# Patient Record
Sex: MALE | URBAN - METROPOLITAN AREA
[De-identification: names, ages, dates, MRNs, and addresses within clinical notes are randomized per-mention and may not be internally consistent; named-entity substitution may affect disease eponyms.]

---

## 2020-12-03 ENCOUNTER — APPOINTMENT (OUTPATIENT)
Dept: URBAN - METROPOLITAN AREA CLINIC 289 | Age: 45
Setting detail: DERMATOLOGY
End: 2020-12-03

## 2020-12-03 DIAGNOSIS — L40.0 PSORIASIS VULGARIS: ICD-10-CM

## 2020-12-03 DIAGNOSIS — L29.8 OTHER PRURITUS: ICD-10-CM

## 2020-12-03 PROCEDURE — OTHER INTRALESIONAL KENALOG: OTHER

## 2020-12-03 PROCEDURE — OTHER PRESCRIPTION: OTHER

## 2020-12-03 PROCEDURE — OTHER TREATMENT REGIMEN: OTHER

## 2020-12-03 PROCEDURE — 99202 OFFICE O/P NEW SF 15 MIN: CPT | Mod: 25

## 2020-12-03 PROCEDURE — 11900 INJECT SKIN LESIONS </W 7: CPT

## 2020-12-03 RX ORDER — BETAMETHASONE VALERATE 1 MG/G
OINTMENT TOPICAL
Qty: 1 | Refills: 2 | Status: ERX | COMMUNITY
Start: 2020-12-03

## 2020-12-03 ASSESSMENT — LOCATION SIMPLE DESCRIPTION DERM
LOCATION SIMPLE: LEFT BUTTOCK
LOCATION SIMPLE: RIGHT ELBOW
LOCATION SIMPLE: LEFT FOREARM
LOCATION SIMPLE: GLUTEAL CLEFT
LOCATION SIMPLE: RIGHT KNEE
LOCATION SIMPLE: LEFT KNEE

## 2020-12-03 ASSESSMENT — BSA PSORIASIS: % BODY COVERED IN PSORIASIS: 4

## 2020-12-03 ASSESSMENT — LOCATION ZONE DERM
LOCATION ZONE: LEG
LOCATION ZONE: ARM
LOCATION ZONE: TRUNK

## 2020-12-03 ASSESSMENT — LOCATION DETAILED DESCRIPTION DERM
LOCATION DETAILED: RIGHT KNEE
LOCATION DETAILED: LEFT KNEE
LOCATION DETAILED: RIGHT ELBOW
LOCATION DETAILED: GLUTEAL CLEFT
LOCATION DETAILED: LEFT PROXIMAL DORSAL FOREARM
LOCATION DETAILED: LEFT MEDIAL BUTTOCK

## 2020-12-03 NOTE — PROCEDURE: INTRALESIONAL KENALOG
Detail Level: Detailed
Kenalog Preparation: Kenalog
Concentration Of Solution Injected (Mg/Ml): 10.0
X Size Of Lesion In Cm (Optional): 0
Consent: The risks of atrophy were reviewed with the patient.
Total Volume Injected (Ccs- Only Use Numbers And Decimals): 3.0
Include Z78.9 (Other Specified Conditions Influencing Health Status) As An Associated Diagnosis?: No
Medical Necessity Clause: This procedure was medically necessary because the lesions that were treated were:

## 2021-01-07 ENCOUNTER — APPOINTMENT (OUTPATIENT)
Dept: URBAN - METROPOLITAN AREA CLINIC 289 | Age: 46
Setting detail: DERMATOLOGY
End: 2021-01-07

## 2021-01-07 DIAGNOSIS — L40.0 PSORIASIS VULGARIS: ICD-10-CM

## 2021-01-07 PROCEDURE — 11900 INJECT SKIN LESIONS </W 7: CPT

## 2021-01-07 PROCEDURE — OTHER INTRALESIONAL KENALOG: OTHER

## 2021-01-07 ASSESSMENT — LOCATION ZONE DERM: LOCATION ZONE: LEG

## 2021-01-07 ASSESSMENT — LOCATION SIMPLE DESCRIPTION DERM: LOCATION SIMPLE: LEFT KNEE

## 2021-01-07 ASSESSMENT — LOCATION DETAILED DESCRIPTION DERM: LOCATION DETAILED: LEFT KNEE

## 2021-01-07 ASSESSMENT — BSA PSORIASIS: % BODY COVERED IN PSORIASIS: 1

## 2021-01-07 NOTE — PROCEDURE: INTRALESIONAL KENALOG
Total Volume Injected (Ccs- Only Use Numbers And Decimals): 1.0
Include Z78.9 (Other Specified Conditions Influencing Health Status) As An Associated Diagnosis?: No
Concentration Of Solution Injected (Mg/Ml): 10.0
Consent: The risks of atrophy were reviewed with the patient.
Kenalog Preparation: Kenalog
Medical Necessity Clause: This procedure was medically necessary because the lesions that were treated were:
X Size Of Lesion In Cm (Optional): 0
Detail Level: Detailed

## 2021-05-27 ENCOUNTER — APPOINTMENT (OUTPATIENT)
Dept: URBAN - METROPOLITAN AREA CLINIC 289 | Age: 46
Setting detail: DERMATOLOGY
End: 2021-05-27

## 2021-05-27 DIAGNOSIS — L85.3 XEROSIS CUTIS: ICD-10-CM

## 2021-05-27 DIAGNOSIS — L40.0 PSORIASIS VULGARIS: ICD-10-CM

## 2021-05-27 PROCEDURE — 11901 INJECT SKIN LESIONS >7: CPT

## 2021-05-27 PROCEDURE — OTHER INTRALESIONAL KENALOG: OTHER

## 2021-05-27 PROCEDURE — OTHER PRESCRIPTION: OTHER

## 2021-05-27 PROCEDURE — 99212 OFFICE O/P EST SF 10 MIN: CPT | Mod: 25

## 2021-05-27 PROCEDURE — OTHER COUNSELING: OTHER

## 2021-05-27 RX ORDER — TRIAMCINOLONE ACETONIDE 1 MG/G
OINTMENT TOPICAL
Qty: 1 | Refills: 4 | Status: ERX | COMMUNITY
Start: 2021-05-27

## 2021-05-27 ASSESSMENT — LOCATION DETAILED DESCRIPTION DERM
LOCATION DETAILED: LEFT PROXIMAL DORSAL FOREARM
LOCATION DETAILED: LEFT ELBOW
LOCATION DETAILED: RIGHT ELBOW
LOCATION DETAILED: LEFT KNEE
LOCATION DETAILED: RIGHT PROXIMAL DORSAL FOREARM
LOCATION DETAILED: LEFT ANTERIOR DISTAL THIGH
LOCATION DETAILED: LEFT PROXIMAL ULNAR DORSAL FOREARM

## 2021-05-27 ASSESSMENT — LOCATION ZONE DERM
LOCATION ZONE: LEG
LOCATION ZONE: ARM

## 2021-05-27 ASSESSMENT — LOCATION SIMPLE DESCRIPTION DERM
LOCATION SIMPLE: LEFT THIGH
LOCATION SIMPLE: RIGHT ELBOW
LOCATION SIMPLE: LEFT ELBOW
LOCATION SIMPLE: LEFT FOREARM
LOCATION SIMPLE: LEFT KNEE
LOCATION SIMPLE: RIGHT FOREARM

## 2021-05-27 NOTE — PROCEDURE: INTRALESIONAL KENALOG
Concentration Of Solution Injected (Mg/Ml): 5.0
Validate Note Data When Using Inventory: Yes
Total Volume Injected (Ccs- Only Use Numbers And Decimals): 2
Kenalog Preparation: Kenalog
Medical Necessity Clause: This procedure was medically necessary because the lesions that were treated were:
Include Z78.9 (Other Specified Conditions Influencing Health Status) As An Associated Diagnosis?: No
Consent: The risks of atrophy were reviewed with the patient.
X Size Of Lesion In Cm (Optional): 0
Detail Level: Detailed

## 2021-06-24 ENCOUNTER — APPOINTMENT (OUTPATIENT)
Dept: URBAN - METROPOLITAN AREA CLINIC 289 | Age: 46
Setting detail: DERMATOLOGY
End: 2021-06-24

## 2021-06-24 DIAGNOSIS — L40.0 PSORIASIS VULGARIS: ICD-10-CM

## 2021-06-24 DIAGNOSIS — L85.3 XEROSIS CUTIS: ICD-10-CM

## 2021-06-24 PROCEDURE — OTHER PRESCRIPTION MEDICATION MANAGEMENT: OTHER

## 2021-06-24 PROCEDURE — OTHER COUNSELING: OTHER

## 2021-06-24 PROCEDURE — OTHER INTRALESIONAL KENALOG: OTHER

## 2021-06-24 PROCEDURE — 99212 OFFICE O/P EST SF 10 MIN: CPT | Mod: 25

## 2021-06-24 PROCEDURE — 11901 INJECT SKIN LESIONS >7: CPT

## 2021-06-24 ASSESSMENT — LOCATION DETAILED DESCRIPTION DERM
LOCATION DETAILED: LEFT KNEE
LOCATION DETAILED: RIGHT ELBOW
LOCATION DETAILED: LEFT ELBOW
LOCATION DETAILED: RIGHT PROXIMAL DORSAL FOREARM
LOCATION DETAILED: LEFT PROXIMAL DORSAL FOREARM
LOCATION DETAILED: LEFT ANTERIOR DISTAL THIGH
LOCATION DETAILED: LEFT PROXIMAL ULNAR DORSAL FOREARM

## 2021-06-24 ASSESSMENT — LOCATION ZONE DERM
LOCATION ZONE: ARM
LOCATION ZONE: LEG

## 2021-06-24 ASSESSMENT — LOCATION SIMPLE DESCRIPTION DERM
LOCATION SIMPLE: LEFT FOREARM
LOCATION SIMPLE: RIGHT FOREARM
LOCATION SIMPLE: LEFT ELBOW
LOCATION SIMPLE: LEFT KNEE
LOCATION SIMPLE: LEFT THIGH
LOCATION SIMPLE: RIGHT ELBOW

## 2021-06-24 NOTE — PROCEDURE: PRESCRIPTION MEDICATION MANAGEMENT
Render In Strict Bullet Format?: No
Continue Regimen: Betamethasone bid prn no longer 2 weeks/month
Detail Level: Zone

## 2021-06-24 NOTE — PROCEDURE: INTRALESIONAL KENALOG
Validate Note Data When Using Inventory: Yes
Kenalog Preparation: Kenalog
X Size Of Lesion In Cm (Optional): 0
Consent: The risks of atrophy were reviewed with the patient.
Concentration Of Solution Injected (Mg/Ml): 5.0
Medical Necessity Clause: This procedure was medically necessary because the lesions that were treated were:
Detail Level: Detailed
Total Volume Injected (Ccs- Only Use Numbers And Decimals): 2
Include Z78.9 (Other Specified Conditions Influencing Health Status) As An Associated Diagnosis?: No

## 2022-07-09 ENCOUNTER — TELEPHONE ENCOUNTER (OUTPATIENT)
Dept: URBAN - METROPOLITAN AREA CLINIC 121 | Facility: CLINIC | Age: 47
End: 2022-07-09

## 2022-07-09 RX ORDER — OMEPRAZOLE MAGNESIUM 20.6 MG/1
CAPSULE, DELAYED RELEASE ORAL TWICE A DAY
Refills: 0 | OUTPATIENT
Start: 2017-10-25 | End: 2018-04-10

## 2022-07-09 RX ORDER — OMEPRAZOLE MAGNESIUM 20.6 MG/1
CAPSULE, DELAYED RELEASE ORAL TWICE A DAY
Refills: 0 | OUTPATIENT
Start: 2017-09-19 | End: 2017-10-25

## 2022-07-09 RX ORDER — ATOMOXETINE HYDROCHLORIDE 80 MG/1
CAPSULE ORAL ONCE A DAY
Refills: 0 | OUTPATIENT
Start: 2017-07-25 | End: 2017-09-19

## 2022-07-09 RX ORDER — ZOLPIDEM TARTRATE 10 MG
TABLET ORAL TAKE AS DIRECTED
Refills: 0 | OUTPATIENT
Start: 2017-10-25 | End: 2018-04-10

## 2022-07-09 RX ORDER — OLANZAPINE 15 MG/1
TABLET ORAL
Refills: 0 | OUTPATIENT
Start: 2017-10-25 | End: 2018-04-10

## 2022-07-09 RX ORDER — BUSPIRONE HYDROCHLORIDE 10 MG/1
TABLET ORAL TWICE A DAY
Refills: 0 | OUTPATIENT
Start: 2018-04-10 | End: 2018-08-21

## 2022-07-09 RX ORDER — MIRTAZAPINE 30 MG/1
TABLET, FILM COATED ORAL
Refills: 0 | OUTPATIENT
Start: 2017-09-19 | End: 2017-10-25

## 2022-07-09 RX ORDER — TRAZODONE HYDROCHLORIDE 100 MG/1
TABLET ORAL
Refills: 0 | OUTPATIENT
Start: 2017-09-19 | End: 2017-10-25

## 2022-07-09 RX ORDER — BUSPIRONE HYDROCHLORIDE 10 MG/1
TABLET ORAL TWICE A DAY
Refills: 0 | OUTPATIENT
Start: 2017-09-19 | End: 2017-10-25

## 2022-07-09 RX ORDER — MECLIZINE HYDROCHLORIDE 25 MG/1
TABLET ORAL TWICE A DAY
Refills: 0 | OUTPATIENT
Start: 2018-04-10 | End: 2018-08-21

## 2022-07-09 RX ORDER — ATOMOXETINE HYDROCHLORIDE 80 MG/1
CAPSULE ORAL ONCE A DAY
Refills: 0 | OUTPATIENT
Start: 2017-10-25 | End: 2018-04-10

## 2022-07-09 RX ORDER — MIRTAZAPINE 30 MG/1
TABLET, FILM COATED ORAL
Refills: 0 | OUTPATIENT
Start: 2018-04-10 | End: 2018-08-21

## 2022-07-09 RX ORDER — TRAZODONE HYDROCHLORIDE 100 MG/1
TABLET ORAL
Refills: 0 | OUTPATIENT
Start: 2017-10-25 | End: 2018-04-10

## 2022-07-09 RX ORDER — MIRTAZAPINE 30 MG/1
TABLET, FILM COATED ORAL
Refills: 0 | OUTPATIENT
Start: 2017-10-25 | End: 2018-04-10

## 2022-07-09 RX ORDER — GLECAPREVIR AND PIBRENTASVIR 40; 100 MG/1; MG/1
TABLET, FILM COATED ORAL
Refills: 1 | OUTPATIENT
Start: 2017-12-28 | End: 2017-12-28

## 2022-07-09 RX ORDER — PHENAZOPYRIDINE HYDROCHLORIDE 100 MG/1
TABLET ORAL AS NEEDED
Refills: 0 | OUTPATIENT
Start: 2018-04-10 | End: 2018-08-21

## 2022-07-09 RX ORDER — ATOMOXETINE HYDROCHLORIDE 80 MG/1
CAPSULE ORAL ONCE A DAY
Refills: 0 | OUTPATIENT
Start: 2018-04-10 | End: 2018-08-21

## 2022-07-09 RX ORDER — OMEPRAZOLE MAGNESIUM 20.6 MG/1
CAPSULE, DELAYED RELEASE ORAL TWICE A DAY
Refills: 0 | OUTPATIENT
Start: 2018-04-10 | End: 2018-08-21

## 2022-07-09 RX ORDER — MIRTAZAPINE 30 MG/1
TABLET, FILM COATED ORAL
Refills: 0 | OUTPATIENT
Start: 2017-07-25 | End: 2017-09-19

## 2022-07-09 RX ORDER — BUSPIRONE HYDROCHLORIDE 10 MG/1
TABLET ORAL TWICE A DAY
Refills: 0 | OUTPATIENT
Start: 2017-10-25 | End: 2018-04-10

## 2022-07-09 RX ORDER — PHENAZOPYRIDINE HYDROCHLORIDE 100 MG/1
TABLET ORAL AS NEEDED
Refills: 0 | OUTPATIENT
Start: 2017-09-19 | End: 2017-10-25

## 2022-07-09 RX ORDER — TRAZODONE HYDROCHLORIDE 100 MG/1
TABLET ORAL
Refills: 0 | OUTPATIENT
Start: 2017-07-25 | End: 2017-09-19

## 2022-07-09 RX ORDER — MECLIZINE HYDROCHLORIDE 25 MG/1
TABLET ORAL TWICE A DAY
Refills: 0 | OUTPATIENT
Start: 2017-07-25 | End: 2017-09-19

## 2022-07-09 RX ORDER — BUSPIRONE HYDROCHLORIDE 10 MG/1
TABLET ORAL TWICE A DAY
Refills: 0 | OUTPATIENT
Start: 2018-08-21 | End: 2018-08-21

## 2022-07-09 RX ORDER — OLANZAPINE 15 MG/1
TABLET ORAL
Refills: 0 | OUTPATIENT
Start: 2018-04-10 | End: 2018-08-21

## 2022-07-09 RX ORDER — FLUOXETINE HYDROCHLORIDE 40 MG/1
CAPSULE ORAL ONCE A DAY
Refills: 0 | OUTPATIENT
Start: 2017-09-19 | End: 2017-10-25

## 2022-07-09 RX ORDER — OMEPRAZOLE MAGNESIUM 20.6 MG/1
CAPSULE, DELAYED RELEASE ORAL TWICE A DAY
Refills: 0 | OUTPATIENT
Start: 2017-07-25 | End: 2017-09-19

## 2022-07-09 RX ORDER — ZOLPIDEM TARTRATE 10 MG
TABLET ORAL TAKE AS DIRECTED
Refills: 0 | OUTPATIENT
Start: 2018-04-10 | End: 2018-08-21

## 2022-07-09 RX ORDER — FLUOXETINE HYDROCHLORIDE 40 MG/1
CAPSULE ORAL ONCE A DAY
Refills: 0 | OUTPATIENT
Start: 2017-07-25 | End: 2017-09-19

## 2022-07-09 RX ORDER — MECLIZINE HYDROCHLORIDE 25 MG/1
TABLET ORAL TWICE A DAY
Refills: 0 | OUTPATIENT
Start: 2017-09-19 | End: 2017-10-25

## 2022-07-09 RX ORDER — ATOMOXETINE HYDROCHLORIDE 80 MG/1
CAPSULE ORAL ONCE A DAY
Refills: 0 | OUTPATIENT
Start: 2017-09-19 | End: 2017-10-25

## 2022-07-09 RX ORDER — ZOLPIDEM TARTRATE 10 MG
TABLET ORAL TAKE AS DIRECTED
Refills: 0 | OUTPATIENT
Start: 2017-09-19 | End: 2017-10-25

## 2022-07-09 RX ORDER — BUSPIRONE HYDROCHLORIDE 10 MG/1
TABLET ORAL TWICE A DAY
Refills: 0 | OUTPATIENT
Start: 2017-07-25 | End: 2017-09-19

## 2022-07-09 RX ORDER — MECLIZINE HYDROCHLORIDE 25 MG/1
TABLET ORAL TWICE A DAY
Refills: 0 | OUTPATIENT
Start: 2017-10-25 | End: 2018-04-10

## 2022-07-09 RX ORDER — LORAZEPAM 0.5 MG/1
TABLET ORAL ONCE A DAY
Refills: 0 | OUTPATIENT
Start: 2018-04-10 | End: 2018-08-21

## 2022-07-09 RX ORDER — PHENAZOPYRIDINE HYDROCHLORIDE 100 MG/1
TABLET ORAL AS NEEDED
Refills: 0 | OUTPATIENT
Start: 2017-10-25 | End: 2018-04-10

## 2022-07-09 RX ORDER — PHENAZOPYRIDINE HYDROCHLORIDE 100 MG/1
TABLET ORAL AS NEEDED
Refills: 0 | OUTPATIENT
Start: 2017-07-25 | End: 2017-09-19

## 2022-07-09 RX ORDER — OLANZAPINE 15 MG/1
TABLET ORAL
Refills: 0 | OUTPATIENT
Start: 2017-09-19 | End: 2017-10-25

## 2022-07-09 RX ORDER — FLUOXETINE HYDROCHLORIDE 40 MG/1
CAPSULE ORAL ONCE A DAY
Refills: 0 | OUTPATIENT
Start: 2017-10-25 | End: 2018-04-10

## 2022-07-10 ENCOUNTER — TELEPHONE ENCOUNTER (OUTPATIENT)
Dept: URBAN - METROPOLITAN AREA CLINIC 121 | Facility: CLINIC | Age: 47
End: 2022-07-10

## 2022-07-10 RX ORDER — PHENAZOPYRIDINE HYDROCHLORIDE 100 MG/1
TABLET ORAL AS NEEDED
Refills: 0 | Status: ACTIVE | COMMUNITY
Start: 2018-08-21

## 2022-07-10 RX ORDER — OLANZAPINE 15 MG/1
TABLET ORAL
Refills: 0 | Status: ACTIVE | COMMUNITY
Start: 2018-08-21

## 2022-07-10 RX ORDER — MECLIZINE HYDROCHLORIDE 25 MG/1
TABLET ORAL TWICE A DAY
Refills: 0 | Status: ACTIVE | COMMUNITY
Start: 2018-08-21

## 2022-07-10 RX ORDER — OMEPRAZOLE MAGNESIUM 20.6 MG/1
CAPSULE, DELAYED RELEASE ORAL TWICE A DAY
Refills: 0 | Status: ACTIVE | COMMUNITY
Start: 2018-08-21

## 2022-07-10 RX ORDER — MIRTAZAPINE 30 MG/1
TABLET, FILM COATED ORAL
Refills: 0 | Status: ACTIVE | COMMUNITY
Start: 2018-08-21

## 2022-07-10 RX ORDER — ZOLPIDEM TARTRATE 10 MG
TABLET ORAL TAKE AS DIRECTED
Refills: 0 | Status: ACTIVE | COMMUNITY
Start: 2018-08-21

## 2022-07-10 RX ORDER — GLECAPREVIR AND PIBRENTASVIR 40; 100 MG/1; MG/1
TABLET, FILM COATED ORAL
Refills: 1 | Status: ACTIVE | COMMUNITY
Start: 2017-12-28

## 2022-07-10 RX ORDER — LORAZEPAM 0.5 MG/1
TABLET ORAL ONCE A DAY
Refills: 0 | Status: ACTIVE | COMMUNITY
Start: 2018-08-21

## 2022-07-10 RX ORDER — ATOMOXETINE HYDROCHLORIDE 80 MG/1
CAPSULE ORAL ONCE A DAY
Refills: 0 | Status: ACTIVE | COMMUNITY
Start: 2018-08-21

## 2022-07-10 RX ORDER — BUSPIRONE HYDROCHLORIDE 10 MG/1
TABLET ORAL TWICE A DAY
Refills: 0 | Status: ACTIVE | COMMUNITY
Start: 2018-08-21